# Patient Record
Sex: MALE | Race: BLACK OR AFRICAN AMERICAN | NOT HISPANIC OR LATINO | URBAN - METROPOLITAN AREA
[De-identification: names, ages, dates, MRNs, and addresses within clinical notes are randomized per-mention and may not be internally consistent; named-entity substitution may affect disease eponyms.]

---

## 2024-07-25 ENCOUNTER — OFFICE VISIT (OUTPATIENT)
Dept: FAMILY MEDICINE | Facility: CLINIC | Age: 29
End: 2024-07-25

## 2024-07-25 DIAGNOSIS — B00.9 HERPES SIMPLEX INFECTION OF SKIN: Primary | ICD-10-CM

## 2024-07-25 RX ORDER — ACYCLOVIR 400 MG/1
400 TABLET ORAL 3 TIMES DAILY
Qty: 30 TABLET | Refills: 0 | Status: SHIPPED | OUTPATIENT
Start: 2024-07-25 | End: 2024-08-04

## 2024-07-26 NOTE — PROGRESS NOTES
[facility] [place]   OFFICE VISIT NOTE    Patient Name: Clint Salas III  YOB: 1995    PRESENTING HISTORY     History of Present Illness:  Mr. Clint Salas III is a 29 y.o. male who is here for cold sore treatment   Patient reports having cold sore on his upper lip that is recurrent and has an active cold sore.   Denies any new sex partner but admits having more life stressors lately   Denies any fever, chills, nausea, vomiting    The patient location is:  [place]  The chief complaint leading to consultation is: Checkup  Visit type: Virtual visit with synchronous audio and video  Total time spent with patient:  Less than 30 minutes  Each patient to whom he or she provides medical services by telemedicine is:  (1) informed of the relationship between the physician and patient and the respective role of any other health care provider with respect to management of the patient; and (2) notified that he or she may decline to receive medical services by telemedicine and may withdraw from such care at any time. Vital signs recorded were provided by the patient.       Review of Systems   Constitutional:  Negative for unexpected weight change.   HENT:  Negative for hearing loss, rhinorrhea and trouble swallowing.    Eyes:  Negative for discharge and visual disturbance.   Respiratory:  Negative for chest tightness and wheezing.    Cardiovascular:  Negative for chest pain and palpitations.   Gastrointestinal:  Negative for blood in stool, constipation, diarrhea and vomiting.   Endocrine: Negative for polydipsia and polyuria.   Genitourinary:  Negative for difficulty urinating, hematuria and urgency.   Musculoskeletal:  Negative for arthralgias, joint swelling and neck pain.   Integumentary:  Positive for mole/lesion.   Neurological:  Negative for weakness and headaches.   Psychiatric/Behavioral:  Negative for confusion and dysphoric mood.           OBJECTIVE:   Vital Signs:  There were no  vitals filed for this visit.        Physical Exam  Constitutional:       General: He is awake.      Appearance: Normal appearance.   HENT:      Mouth/Throat:      Comments: Cold sore visible on upper lip   Pulmonary:      Effort: Pulmonary effort is normal.   Musculoskeletal:      Cervical back: Normal range of motion.   Neurological:      General: No focal deficit present.      Mental Status: He is alert.   Psychiatric:         Mood and Affect: Mood normal.         Behavior: Behavior is cooperative.         ASSESSMENT & PLAN:     Herpes simplex infection of skin  -     acyclovir (ZOVIRAX) 400 MG tablet; Take 1 tablet (400 mg total) by mouth 3 (three) times daily. for 10 days  Dispense: 30 tablet; Refill: 0  Acyclovir prescribed for the oral herpes simplex infection of skin  Patient currently does not have a PCP -- advised him to follow up in person in the next few weeks to [facility] care. Appt made for September with me   All questions/concerns addressed        Nasrin Lainez MD  Family Medicine  [facility] [place]     This note was created using Sensitive Object voice recognition software that occasionally misinterprets phrases or words

## 2024-08-25 ENCOUNTER — ON-DEMAND VIRTUAL (OUTPATIENT)
Dept: URGENT CARE | Facility: CLINIC | Age: 29
End: 2024-08-25

## 2024-08-25 DIAGNOSIS — B00.1 COLD SORE: Primary | ICD-10-CM

## 2024-08-25 PROCEDURE — 99213 OFFICE O/P EST LOW 20 MIN: CPT | Mod: 95,,, | Performed by: NURSE PRACTITIONER

## 2024-08-25 RX ORDER — VALACYCLOVIR HYDROCHLORIDE 1 G/1
1000 TABLET, FILM COATED ORAL DAILY
Qty: 5 TABLET | Refills: 0 | Status: SHIPPED | OUTPATIENT
Start: 2024-08-25 | End: 2024-08-30

## 2024-08-25 NOTE — PROGRESS NOTES
Subjective:      Patient ID: Clint Salas III is a 29 y.o. male.    Vitals:  vitals were not taken for this visit.     Chief Complaint: Mouth Lesions      Visit Type: TELE AUDIOVISUAL    Present with the patient at the time of consultation: TELEMED PRESENT WITH PATIENT: None, in car    History reviewed. No pertinent past medical history.  History reviewed. No pertinent surgical history.  Review of patient's allergies indicates:  No Known Allergies  Current Outpatient Medications on File Prior to Visit   Medication Sig Dispense Refill    [DISCONTINUED] acyclovir (ZOVIRAX) 400 MG tablet Take 1 tablet (400 mg total) by mouth 3 (three) times daily. for 10 days 30 tablet 0     No current facility-administered medications on file prior to visit.     No family history on file.    Medications Ordered                [id number] [place] - [address]   [address] [address]    Telephone: [phone]   Fax: [phone]   Hours: Not open 24 hours                         E-Prescribed (1 of 1)              valACYclovir (VALTREX) 1000 MG tablet    Sig: Take 1 tablet (1,000 mg total) by mouth once daily. for 5 days       Start: 8/25/24     Quantity: 5 tablet Refills: 0                           [facility] Peq [facility] Intake    8/25/2024 12:23 PM CDT - Filed by Patient   What is your current physical address in the event of a medical emergency? [address]   Are you able to take your vital signs? No   Please attach any relevant images or files          Cold sore to lower lip. Onset this AM. Typical presentation. +tingling. Valtrex works best.    Mouth Lesions   Associated symptoms include mouth sores. Pertinent negatives include no fever.       Constitution: Negative for fever.   HENT:  Positive for mouth sores.         Objective:   The physical exam was conducted virtually.  Physical Exam   Constitutional: He is oriented to person, place, and time. He does not appear ill. No distress.   HENT:    Head: Normocephalic and atraumatic.   Nose: Nose normal.   Mouth/Throat: Oral lesions: HSV lesion.       Eyes: Extraocular movement intact   Pulmonary/Chest: Effort normal.   Abdominal: Normal appearance.   Musculoskeletal: Normal range of motion.         General: Normal range of motion.   Neurological: no focal deficit. He is alert and oriented to person, place, and time.   Psychiatric: His behavior is normal. Mood normal.   Vitals reviewed.      Assessment:     1. Cold sore        Plan:   Patient encouraged to monitor symptoms closely and instructed to follow-up for new or worsening symptoms. Further, in-person, evaluation may be necessary for continued treatment. Please follow up with your primary care doctor or specialist as needed. Verbally discussed plan. Patient confirms understanding and is in agreement with treatment and plan.     You must understand that you've received a Virtual Care evaluation only and that you may be released before all your medical problems are known or treated. You, the patient, will arrange for follow up care as instructed.      Cold sore  -     valACYclovir (VALTREX) 1000 MG tablet; Take 1 tablet (1,000 mg total) by mouth once daily. for 5 days  Dispense: 5 tablet; Refill: 0        Patient Instructions   Patient Education       Cold Sores (Oral Herpes) Discharge Instructions   About this topic   Cold sores are caused by the herpes simplex virus. They are often called fever blisters. This infection causes small, painful, fluid-filled blisters or sores on or near the lips and inside the mouth. The blisters are often painful and may start as red, brown, or white spots on the gums or lips. They may break open and form scabs. This infection may be spread from person to person.  The first time you have a cold sore you may also have:  Swelling in the neck  Fever  Pain in your mouth and throat  Body aches  You may get cold sores just one time or you may get them again and again. If you  get cold sores again, you may only have the blisters. You may not have the other signs at all, or they may be much milder.  What care is needed at home?   Ask your doctor what you need to do when you go home. Make sure you ask questions if you do not understand what the doctor says. This way you will know what you need to do.  Apply ice or a cold, wet towel on the sores a few times each day. This will help with redness and swelling.  Your doctor may order a mouth rinse that can numb your gums and help with mouth pain.  Your doctor may give you a cream or ointment to apply as you feel the sore developing. Be sure to follow the instructions for use.  Your doctor may give you a drug to take as you feel the cold sore starting. Be sure to follow the instructions for use.  Wash your hands with soap and water every time you touch your sore.  When you have a cold sore, protect others:  Do not kiss anyone.  Avoid sharing personal items like razors, towels, makeup, cups, or eating utensils.  Do not share a toothbrush. Get a new toothbrush when the cold sores are gone.  Do not give anyone oral sex.  What follow-up care is needed?   Your doctor may ask you to make visits to the office to check on your progress. Be sure to keep these visits.  Talk with your doctor about how often you are having cold sores. Your doctor may be able to help you have fewer cold sores.  What drugs may be needed?   The doctor may order drugs to:  Help with pain  Promote healing  Prevent cold sores from coming back  What changes to diet are needed?   Avoid foods and juices that are high in acid like tomatoes, oranges, and grapefruit.  What problems could happen?   Infection moves to other parts of the body  What can be done to prevent this health problem?   Avoid kissing people who have cold sores.  Do not share cups, eating utensils, towels, or other items used by someone with cold sores.  Use sunscreen on your lips and face. Avoid being in the sun  for long periods of time.  Wash your hands often. Try not to touch the cold sore. This will help to prevent spreading the infection to your eyes, genitals, or to other people.  When do I need to call the doctor?   Signs of infection. These include a fever of 100.4°F (38°C) or higher, chills.  Signs of wound infection. These include swelling, redness, warmth around the sore; too much pain when touched; yellowish, greenish, or bloody discharge; foul smell coming from the sore.  You have a weakened immune system and get a cold sore  You get a cold sore near your eye  The sores make it hard for you to talk, eat, or swallow  Cold sores do not heal in 10 days  You get cold sores often  Teach Back: Helping You Understand   The Teach Back Method helps you understand the information we are giving you. After you talk with the staff, tell them in your own words what you learned. This helps to make sure the staff has described each thing clearly. It also helps to explain things that may have been confusing. Before going home, make sure you can do these:  I can tell you about my condition.  I can tell you how to care for my cold sore.  I can tell you what I will do if I have swelling, redness, or warmth around my sore or it does not heal in 10 days.  Where can I learn more?   KidsHealth  http://kidshealth.org/parent/infections/skin/cold_sores.html   National Health Service  https://www.nhs.uk/conditions/cold-sores/   Last Reviewed Date   2021-04-21  Consumer Information Use and Disclaimer   This information is not specific medical advice and does not replace information you receive from your health care provider. This is only a brief summary of general information. It does NOT include all information about conditions, illnesses, injuries, tests, procedures, treatments, therapies, discharge instructions or life-style choices that may apply to you. You must talk with your health care provider for complete information about your  health and treatment options. This information should not be used to decide whether or not to accept your health care providers advice, instructions or recommendations. Only your health care provider has the knowledge and training to provide advice that is right for you.  Copyright   Copyright © 2021 [employer] and its affiliates and/or licensors. All rights reserved.

## 2024-08-25 NOTE — PATIENT INSTRUCTIONS
Patient Education       Cold Sores (Oral Herpes) Discharge Instructions   About this topic   Cold sores are caused by the herpes simplex virus. They are often called fever blisters. This infection causes small, painful, fluid-filled blisters or sores on or near the lips and inside the mouth. The blisters are often painful and may start as red, brown, or white spots on the gums or lips. They may break open and form scabs. This infection may be spread from person to person.  The first time you have a cold sore you may also have:  Swelling in the neck  Fever  Pain in your mouth and throat  Body aches  You may get cold sores just one time or you may get them again and again. If you get cold sores again, you may only have the blisters. You may not have the other signs at all, or they may be much milder.  What care is needed at home?   Ask your doctor what you need to do when you go home. Make sure you ask questions if you do not understand what the doctor says. This way you will know what you need to do.  Apply ice or a cold, wet towel on the sores a few times each day. This will help with redness and swelling.  Your doctor may order a mouth rinse that can numb your gums and help with mouth pain.  Your doctor may give you a cream or ointment to apply as you feel the sore developing. Be sure to follow the instructions for use.  Your doctor may give you a drug to take as you feel the cold sore starting. Be sure to follow the instructions for use.  Wash your hands with soap and water every time you touch your sore.  When you have a cold sore, protect others:  Do not kiss anyone.  Avoid sharing personal items like razors, towels, makeup, cups, or eating utensils.  Do not share a toothbrush. Get a new toothbrush when the cold sores are gone.  Do not give anyone oral sex.  What follow-up care is needed?   Your doctor may ask you to make visits to the office to check on your progress. Be sure to keep these visits.  Talk with  your doctor about how often you are having cold sores. Your doctor may be able to help you have fewer cold sores.  What drugs may be needed?   The doctor may order drugs to:  Help with pain  Promote healing  Prevent cold sores from coming back  What changes to diet are needed?   Avoid foods and juices that are high in acid like tomatoes, oranges, and grapefruit.  What problems could happen?   Infection moves to other parts of the body  What can be done to prevent this health problem?   Avoid kissing people who have cold sores.  Do not share cups, eating utensils, towels, or other items used by someone with cold sores.  Use sunscreen on your lips and face. Avoid being in the sun for long periods of time.  Wash your hands often. Try not to touch the cold sore. This will help to prevent spreading the infection to your eyes, genitals, or to other people.  When do I need to call the doctor?   Signs of infection. These include a fever of 100.4°F (38°C) or higher, chills.  Signs of wound infection. These include swelling, redness, warmth around the sore; too much pain when touched; yellowish, greenish, or bloody discharge; foul smell coming from the sore.  You have a weakened immune system and get a cold sore  You get a cold sore near your eye  The sores make it hard for you to talk, eat, or swallow  Cold sores do not heal in 10 days  You get cold sores often  Teach Back: Helping You Understand   The Teach Back Method helps you understand the information we are giving you. After you talk with the staff, tell them in your own words what you learned. This helps to make sure the staff has described each thing clearly. It also helps to explain things that may have been confusing. Before going home, make sure you can do these:  I can tell you about my condition.  I can tell you how to care for my cold sore.  I can tell you what I will do if I have swelling, redness, or warmth around my sore or it does not heal in 10  days.  Where can I learn more?   KidsHealth  http://kidshealth.org/parent/infections/skin/cold_sores.html   National Health Service  https://www.nhs.uk/conditions/cold-sores/   Last Reviewed Date   2021-04-21  Consumer Information Use and Disclaimer   This information is not specific medical advice and does not replace information you receive from your health care provider. This is only a brief summary of general information. It does NOT include all information about conditions, illnesses, injuries, tests, procedures, treatments, therapies, discharge instructions or life-style choices that may apply to you. You must talk with your health care provider for complete information about your health and treatment options. This information should not be used to decide whether or not to accept your health care providers advice, instructions or recommendations. Only your health care provider has the knowledge and training to provide advice that is right for you.  Copyright   Copyright © 2021 [employer] and its affiliates and/or licensors. All rights reserved.

## 2024-08-31 ENCOUNTER — E-VISIT (OUTPATIENT)
Dept: FAMILY MEDICINE | Facility: CLINIC | Age: 29
End: 2024-08-31

## 2024-08-31 DIAGNOSIS — B00.9 RECURRENT HSV (HERPES SIMPLEX VIRUS): Primary | ICD-10-CM

## 2024-08-31 DIAGNOSIS — B00.1 COLD SORE: ICD-10-CM

## 2024-09-02 RX ORDER — VALACYCLOVIR HYDROCHLORIDE 1 G/1
1000 TABLET, FILM COATED ORAL DAILY
Qty: 5 TABLET | Refills: 0 | Status: SHIPPED | OUTPATIENT
Start: 2024-09-02 | End: 2024-09-06

## 2024-09-03 NOTE — PROGRESS NOTES
Patient ID: Clint Salas III is a 29 y.o. male.    Chief Complaint: General Illness (Entered automatically based on patient selection in [employer].)    The patient initiated a request through [employer] on 8/31/2024 for evaluation and management with a chief complaint of General Illness (Entered automatically based on patient selection in [employer].)     I evaluated the questionnaire submission on 9/2/2024.    [facility] Peq Evisit [facility]-Medication    8/31/2024  5:59 PM CDT - Filed by Patient   What do you need help with? Medication Request   Do you agree to participate in an E-Visit? Yes   If you have any of the following symptoms, please present to your local emergency room or call 911:  I acknowledge   Medication requests for narcotics will not be addressed via an E-Visit.  Please schedule an appointment. I acknowledge   Do you want to address a new or existing medication? I would like to address a medication I currently take   What is the main issue you would like addressed today? My cold sores keep popping up! I just need prescription filled for valacyclovir asap!!   Would you like to change or continue your medication? Continue medication   What medication would you like to continue?  valacyclovir   Are you taking it as prescribed? Yes    What medical condition is the  medication intended to treat? Cold sore   Is the medication helping your condition? I don't know   Are you having any side effects from the medication? No   Provide any additional information you feel is important. None   Please attach any relevant images or files    Are you able to take your vital signs? Yes   Systolic Blood Pressure:    Diastolic Blood Pressure:    Weight:    Height:    Pulse:    Temperature:    Respiration rate:    Pulse Oxygen:          Encounter Diagnoses   Name Primary?    Recurrent HSV (herpes simplex virus) Yes    Cold sore         No orders of the defined types were placed in this encounter.     Medications Ordered This  Encounter   Medications    valACYclovir (VALTREX) 500 MG tablet     Sig: Take 1 tablet (500 mg total) by mouth once daily.     Dispense:  90 tablet     Refill:  1          No follow-ups on file.      E-Visit Time Tracking:    Day 1 Time (in minutes): 11    Total Time (in minutes): 11

## 2024-09-06 ENCOUNTER — LAB VISIT (OUTPATIENT)
Dept: LAB | Facility: HOSPITAL | Age: 29
End: 2024-09-06
Attending: STUDENT IN AN ORGANIZED HEALTH CARE EDUCATION/TRAINING PROGRAM

## 2024-09-06 ENCOUNTER — OFFICE VISIT (OUTPATIENT)
Dept: FAMILY MEDICINE | Facility: CLINIC | Age: 29
End: 2024-09-06

## 2024-09-06 VITALS
OXYGEN SATURATION: 96 % | HEART RATE: 102 BPM | WEIGHT: 164.69 LBS | RESPIRATION RATE: 18 BRPM | HEIGHT: 67 IN | SYSTOLIC BLOOD PRESSURE: 126 MMHG | BODY MASS INDEX: 25.85 KG/M2 | DIASTOLIC BLOOD PRESSURE: 70 MMHG

## 2024-09-06 DIAGNOSIS — Z00.00 ROUTINE GENERAL MEDICAL EXAMINATION AT A HEALTH CARE FACILITY: Primary | ICD-10-CM

## 2024-09-06 DIAGNOSIS — Z00.00 ROUTINE GENERAL MEDICAL EXAMINATION AT A HEALTH CARE FACILITY: ICD-10-CM

## 2024-09-06 DIAGNOSIS — F17.200 SMOKING: ICD-10-CM

## 2024-09-06 LAB
ALBUMIN SERPL BCP-MCNC: 4.4 G/DL (ref 3.5–5.2)
ALP SERPL-CCNC: 57 U/L (ref 55–135)
ALT SERPL W/O P-5'-P-CCNC: 17 U/L (ref 10–44)
ANION GAP SERPL CALC-SCNC: 10 MMOL/L (ref 8–16)
AST SERPL-CCNC: 18 U/L (ref 10–40)
BILIRUB SERPL-MCNC: 0.6 MG/DL (ref 0.1–1)
BUN SERPL-MCNC: 13 MG/DL (ref 6–20)
CALCIUM SERPL-MCNC: 10.3 MG/DL (ref 8.7–10.5)
CHLORIDE SERPL-SCNC: 103 MMOL/L (ref 95–110)
CHOLEST SERPL-MCNC: 218 MG/DL (ref 120–199)
CHOLEST/HDLC SERPL: 4.1 {RATIO} (ref 2–5)
CO2 SERPL-SCNC: 24 MMOL/L (ref 23–29)
CREAT SERPL-MCNC: 0.9 MG/DL (ref 0.5–1.4)
ERYTHROCYTE [DISTWIDTH] IN BLOOD BY AUTOMATED COUNT: 13.2 % (ref 11.5–14.5)
EST. GFR  (NO RACE VARIABLE): >60 ML/MIN/1.73 M^2
ESTIMATED AVG GLUCOSE: 114 MG/DL (ref 68–131)
GLUCOSE SERPL-MCNC: 104 MG/DL (ref 70–110)
HBA1C MFR BLD: 5.6 % (ref 4–5.6)
HCT VFR BLD AUTO: 43.5 % (ref 40–54)
HDLC SERPL-MCNC: 53 MG/DL (ref 40–75)
HDLC SERPL: 24.3 % (ref 20–50)
HGB BLD-MCNC: 15 G/DL (ref 14–18)
LDLC SERPL CALC-MCNC: 148 MG/DL (ref 63–159)
MCH RBC QN AUTO: 29.6 PG (ref 27–31)
MCHC RBC AUTO-ENTMCNC: 34.5 G/DL (ref 32–36)
MCV RBC AUTO: 86 FL (ref 82–98)
NONHDLC SERPL-MCNC: 165 MG/DL
PLATELET # BLD AUTO: 193 K/UL (ref 150–450)
PMV BLD AUTO: 12.9 FL (ref 9.2–12.9)
POTASSIUM SERPL-SCNC: 4 MMOL/L (ref 3.5–5.1)
PROT SERPL-MCNC: 7.7 G/DL (ref 6–8.4)
RBC # BLD AUTO: 5.07 M/UL (ref 4.6–6.2)
SODIUM SERPL-SCNC: 137 MMOL/L (ref 136–145)
TRIGL SERPL-MCNC: 85 MG/DL (ref 30–150)
WBC # BLD AUTO: 8.26 K/UL (ref 3.9–12.7)

## 2024-09-06 PROCEDURE — 85027 COMPLETE CBC AUTOMATED: CPT | Performed by: STUDENT IN AN ORGANIZED HEALTH CARE EDUCATION/TRAINING PROGRAM

## 2024-09-06 PROCEDURE — 99406 BEHAV CHNG SMOKING 3-10 MIN: CPT | Mod: S$PBB,,, | Performed by: STUDENT IN AN ORGANIZED HEALTH CARE EDUCATION/TRAINING PROGRAM

## 2024-09-06 PROCEDURE — 80061 LIPID PANEL: CPT | Performed by: STUDENT IN AN ORGANIZED HEALTH CARE EDUCATION/TRAINING PROGRAM

## 2024-09-06 PROCEDURE — 83036 HEMOGLOBIN GLYCOSYLATED A1C: CPT | Performed by: STUDENT IN AN ORGANIZED HEALTH CARE EDUCATION/TRAINING PROGRAM

## 2024-09-06 PROCEDURE — 99999 PR PBB SHADOW E&M-EST. PATIENT-LVL IV: CPT | Mod: PBBFAC,,, | Performed by: STUDENT IN AN ORGANIZED HEALTH CARE EDUCATION/TRAINING PROGRAM

## 2024-09-06 PROCEDURE — 36415 COLL VENOUS BLD VENIPUNCTURE: CPT | Mod: PO | Performed by: STUDENT IN AN ORGANIZED HEALTH CARE EDUCATION/TRAINING PROGRAM

## 2024-09-06 PROCEDURE — 80053 COMPREHEN METABOLIC PANEL: CPT | Performed by: STUDENT IN AN ORGANIZED HEALTH CARE EDUCATION/TRAINING PROGRAM

## 2024-09-06 PROCEDURE — 99395 PREV VISIT EST AGE 18-39: CPT | Mod: S$PBB,25,, | Performed by: STUDENT IN AN ORGANIZED HEALTH CARE EDUCATION/TRAINING PROGRAM

## 2024-09-06 PROCEDURE — 99214 OFFICE O/P EST MOD 30 MIN: CPT | Mod: PBBFAC,PO | Performed by: STUDENT IN AN ORGANIZED HEALTH CARE EDUCATION/TRAINING PROGRAM

## 2024-09-06 RX ORDER — VALACYCLOVIR HYDROCHLORIDE 500 MG/1
500 TABLET, FILM COATED ORAL DAILY
Qty: 90 TABLET | Refills: 1 | Status: SHIPPED | OUTPATIENT
Start: 2024-09-06 | End: 2025-09-06

## 2024-09-06 NOTE — ADDENDUM NOTE
Addended by: MARIANA TREVIZO on: 9/6/2024 07:54 AM     Modules accepted: Orders, Level of Service

## 2024-09-06 NOTE — PATIENT INSTRUCTIONS
Chantix   Wellbutrin    These are the medications that may help with smoking cessation

## 2024-09-06 NOTE — PROGRESS NOTES
[facility]  Office Visit Note     SUBJECTIVE:   HPI: Clint Salas III  is a 29 y.o. 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 back to back      Lab Visit on 09/06/2024   Component Date Value Ref Range Status    Hemoglobin A1C 09/06/2024 5.6  4.0 - 5.6 % Final    Estimated Avg Glucose 09/06/2024 114  68 - 131 mg/dL Final    Cholesterol 09/06/2024 218 (H)  120 - 199 mg/dL Final    Triglycerides 09/06/2024 85  30 - 150 mg/dL Final    HDL 09/06/2024 53  40 - 75 mg/dL Final    LDL Cholesterol 09/06/2024 148.0  63.0 - 159.0 mg/dL Final    HDL/Cholesterol Ratio 09/06/2024 24.3  20.0 - 50.0 % Final    Total Cholesterol/HDL Ratio 09/06/2024 4.1  2.0 - 5.0 Final    Non-HDL Cholesterol 09/06/2024 165  mg/dL Final    Sodium 09/06/2024 137  136 - 145 mmol/L Final    Potassium 09/06/2024 4.0  3.5 - 5.1 mmol/L Final    Chloride 09/06/2024 103  95 - 110 mmol/L Final    CO2 09/06/2024 24  23 - 29 mmol/L Final    Glucose 09/06/2024 104  70 - 110 mg/dL Final    BUN 09/06/2024 13  6 - 20 mg/dL Final    Creatinine 09/06/2024 0.9  0.5 - 1.4 mg/dL Final    Calcium 09/06/2024 10.3  8.7 - 10.5 mg/dL Final    Total Protein 09/06/2024 7.7  6.0 - 8.4 g/dL Final    Albumin 09/06/2024 4.4  3.5 - 5.2 g/dL Final    Total Bilirubin 09/06/2024 0.6  0.1 - 1.0 mg/dL Final    Alkaline Phosphatase 09/06/2024 57  55 - 135 U/L Final    AST 09/06/2024 18  10 - 40 U/L Final    ALT 09/06/2024 17  10 - 44 U/L Final    eGFR 09/06/2024 >60.0  >60 mL/min/1.73 m^2 Final    Anion Gap 09/06/2024 10  8 - 16 mmol/L Final    WBC 09/06/2024 8.26  3.90 - 12.70 K/uL Final    RBC 09/06/2024 5.07  4.60 - 6.20 M/uL Final    Hemoglobin 09/06/2024 15.0   14.0 - 18.0 g/dL Final    Hematocrit 09/06/2024 43.5  40.0 - 54.0 % Final    MCV 09/06/2024 86  82 - 98 fL Final    MCH 09/06/2024 29.6  27.0 - 31.0 pg Final    MCHC 09/06/2024 34.5  32.0 - 36.0 g/dL Final    RDW 09/06/2024 13.2  11.5 - 14.5 % Final    Platelets 09/06/2024 193  150 - 450 K/uL Final    MPV 09/06/2024 12.9  9.2 - 12.9 fL Final         Current Outpatient Medications on File Prior to Visit   Medication Sig Dispense Refill    valACYclovir (VALTREX) 500 MG tablet Take 1 tablet (500 mg total) by mouth once daily. 90 tablet 1     No current facility-administered medications on file prior to visit.     History reviewed. No pertinent past medical history.  History reviewed. No pertinent surgical history.  History reviewed. No pertinent surgical history.  Family History   Problem Relation Name Age of Onset    Diabetes Mother         Marital Status: Single  Alcohol History:  reports current alcohol use.  Tobacco History:  reports that he has been smoking cigarettes. He has a 2.5 pack-year smoking history. He has been exposed to tobacco smoke. He has never used smokeless tobacco.  Drug History:  reports no history of drug use.    The patient has no Health Maintenance topics of status Not Due    There is no immunization history on file for this patient.    Review of patient's allergies indicates:  No Known Allergies    Current Outpatient Medications:     valACYclovir (VALTREX) 500 MG tablet, Take 1 tablet (500 mg total) by mouth once daily., Disp: 90 tablet, Rfl: 1    Review of Systems   Constitutional:  Negative for chills, diaphoresis, fatigue and fever.   Respiratory:  Negative for cough, shortness of breath and wheezing.    Cardiovascular:  Negative for chest pain and palpitations.   Gastrointestinal:  Negative for constipation, diarrhea, nausea and vomiting.   Genitourinary:  Negative for bladder incontinence.   Neurological:  Negative for coordination difficulties.   Psychiatric/Behavioral:  Negative for  "behavioral problems.        OBJECTIVE:      Vitals:    09/06/24 1506   BP: 126/70   BP Location: Right arm   Patient Position: Sitting   BP Method: Medium (Manual)   Pulse: 102   Resp: 18   SpO2: 96%   Weight: 74.7 kg (164 lb 10.9 oz)   Height: 5' 7" (1.702 m)     Physical Exam  Constitutional:       General: He is not in acute distress.     Appearance: He is not ill-appearing or toxic-appearing.   HENT:      Head: Normocephalic and atraumatic.      Mouth/Throat:      Mouth: Mucous membranes are moist.      Pharynx: Uvula midline. No pharyngeal swelling.   Cardiovascular:      Rate and Rhythm: Normal rate and regular rhythm.   Pulmonary:      Effort: Pulmonary effort is normal. No tachypnea, bradypnea, accessory muscle usage, prolonged expiration or respiratory distress.      Breath sounds: Normal breath sounds. No stridor. No wheezing, rhonchi or rales.   Neurological:      General: No focal deficit present.      Mental Status: He is alert.   Psychiatric:         Mood and Affect: Mood normal.         Behavior: Behavior normal.        Assessment:       1. Routine general medical examination at a health care facility    2. Smoking        Plan:       Routine general medical examination at a health care facility  -     Hemoglobin A1C; Future; Expected date: 09/06/2024  -     Lipid Panel; Future; Expected date: 09/06/2024  -     Comprehensive Metabolic Panel; Future; Expected date: 09/06/2024  -     CBC Without Differential; Future; Expected date: 09/06/2024  Will get baseline lab work today   Recommended Tdap vaccine to the patient -- due to cost, patient will consider it at the next visit  Advised on the importance of smoking cessation and safe sex practices     Smoking  Patient counseled on smoking cessation. I discussed options such as nicotine replacement products, wellbutrin, and chantix.  Side effects, benefits and risks were discussed regarding each. I offered [facility].  " All questions were answered. I spent 3 minutes on smoking cessation.   Patient most interested in medication options including wellbutrin and chantix -- will consider both options and will get back to me with his preference      Counseled on age and gender appropriate medical preventative services, including cancer screenings, immunizations, overall nutritional health, need for a consistent exercise regimen and an overall push towards maintaining a vigorous and active lifestyle.      Follow up in 1 year or sooner if indicated/needed     Nasrin Lainez M.D.  9/8/2024    This note was created using [employer] voice recognition software that occasionally misinterprets phrases or words

## 2024-12-31 DIAGNOSIS — B00.9 RECURRENT HSV (HERPES SIMPLEX VIRUS): ICD-10-CM

## 2025-01-06 RX ORDER — VALACYCLOVIR HYDROCHLORIDE 500 MG/1
500 TABLET, FILM COATED ORAL DAILY
Qty: 90 TABLET | Refills: 1 | Status: SHIPPED | OUTPATIENT
Start: 2025-01-06 | End: 2026-01-06

## 2025-08-06 DIAGNOSIS — B00.9 RECURRENT HSV (HERPES SIMPLEX VIRUS): ICD-10-CM

## 2025-08-07 RX ORDER — VALACYCLOVIR HYDROCHLORIDE 500 MG/1
500 TABLET, FILM COATED ORAL DAILY
Qty: 90 TABLET | Refills: 1 | Status: SHIPPED | OUTPATIENT
Start: 2025-08-07 | End: 2026-08-07